# Patient Record
Sex: FEMALE | Race: WHITE | NOT HISPANIC OR LATINO | Employment: OTHER | URBAN - NONMETROPOLITAN AREA
[De-identification: names, ages, dates, MRNs, and addresses within clinical notes are randomized per-mention and may not be internally consistent; named-entity substitution may affect disease eponyms.]

---

## 2019-01-10 ENCOUNTER — OFFICE VISIT (OUTPATIENT)
Dept: ORTHOPEDIC SURGERY | Facility: CLINIC | Age: 69
End: 2019-01-10

## 2019-01-10 DIAGNOSIS — M25.512 BILATERAL SHOULDER PAIN, UNSPECIFIED CHRONICITY: Primary | ICD-10-CM

## 2019-01-10 DIAGNOSIS — M25.511 BILATERAL SHOULDER PAIN, UNSPECIFIED CHRONICITY: Primary | ICD-10-CM

## 2019-01-10 PROCEDURE — 73030 X-RAY EXAM OF SHOULDER: CPT | Performed by: ORTHOPAEDIC SURGERY

## 2019-01-10 PROCEDURE — 99203 OFFICE O/P NEW LOW 30 MIN: CPT | Performed by: ORTHOPAEDIC SURGERY

## 2019-01-10 RX ORDER — PROPRANOLOL HYDROCHLORIDE 40 MG/1
TABLET ORAL
Refills: 3 | COMMUNITY
Start: 2018-11-24

## 2019-01-10 RX ORDER — ASPIRIN 325 MG
325 TABLET ORAL DAILY
COMMUNITY

## 2019-01-10 RX ORDER — LISINOPRIL AND HYDROCHLOROTHIAZIDE 20; 12.5 MG/1; MG/1
TABLET ORAL
Refills: 3 | COMMUNITY
Start: 2018-11-23

## 2019-01-10 RX ORDER — AMLODIPINE BESYLATE 5 MG/1
TABLET ORAL
Refills: 1 | COMMUNITY
Start: 2018-12-04

## 2019-01-10 RX ORDER — DULOXETIN HYDROCHLORIDE 60 MG/1
CAPSULE, DELAYED RELEASE ORAL
Refills: 0 | COMMUNITY
Start: 2018-11-02

## 2019-01-10 NOTE — PROGRESS NOTES
Chief Complaint   Patient presents with   • Left Shoulder - Establish Care   • Right Shoulder - Establish Care   This is a new patient here today complaining of bilateral shoulder pain for several years.        HPI the patient states that she has bilateral shoulder pain.  Left sided symptoms are worse than the right side.  She states that she has had the symptoms for several years but over the past 6 months symptoms have gotten a lot worse.  She describes her sensation of pain is a severe grinding sensation.  It is worse with abduction of the shoulder.  She has difficulty in reaching into the overhead position.  Cross body activities bother the patient significantly.  She has been seeing a pain clinic specialist who has injected her shoulders with steroid.  That intervention does help her shoulder to some degree.  She is here for second opinion because she thinks that her function is getting progressively impaired.  She cannot sleep in bed at night.  She finds it difficult to hold heavy objects into the overhead extended position.  The patient states that she did have a history of juvenile rheumatoid arthritis and that most likely affected her left shoulder as a young girl.  That has left her with a significant amount of arthritis with pain swelling and lack of motion.          Allergies   Allergen Reactions   • Bactrim [Sulfamethoxazole-Trimethoprim] Unknown (See Comments)     UNKNOWN   • Darvon [Propoxyphene] Unknown (See Comments)     UNKNOWN     • Penicillins Unknown (See Comments)     UNKNOWN         Social History     Socioeconomic History   • Marital status:      Spouse name: Not on file   • Number of children: Not on file   • Years of education: Not on file   • Highest education level: Not on file   Social Needs   • Financial resource strain: Not on file   • Food insecurity - worry: Not on file   • Food insecurity - inability: Not on file   • Transportation needs - medical: Not on file   •  Transportation needs - non-medical: Not on file   Occupational History   • Not on file   Tobacco Use   • Smoking status: Not on file   Substance and Sexual Activity   • Alcohol use: Not on file   • Drug use: Not on file   • Sexual activity: Not on file   Other Topics Concern   • Not on file   Social History Narrative   • Not on file       No family history on file.    No past surgical history on file.    No past medical history on file.        There were no vitals filed for this visit.          Review of Systems   Constitutional: Negative.    HENT: Negative.    Eyes: Negative.    Respiratory: Negative.    Cardiovascular: Negative.    Gastrointestinal: Negative.    Endocrine: Negative.    Genitourinary: Negative.    Musculoskeletal: Positive for joint swelling.   Skin: Negative.    Allergic/Immunologic: Negative.    Neurological: Negative.    Hematological: Negative.    Psychiatric/Behavioral: Negative.            Physical Exam   Constitutional: She is oriented to person, place, and time. She appears well-nourished.   HENT:   Head: Atraumatic.   Eyes: EOM are normal.   Neck: Neck supple.   Cardiovascular: Regular rhythm, normal heart sounds and intact distal pulses.   Pulmonary/Chest: Breath sounds normal.   Abdominal: Bowel sounds are normal.   Musculoskeletal: She exhibits edema and tenderness.   Neurological: She is alert and oriented to person, place, and time.   Skin: Skin is dry. Capillary refill takes 2 to 3 seconds.   Psychiatric: She has a normal mood and affect. Her behavior is normal. Judgment and thought content normal.   Nursing note and vitals reviewed.              Joint/Body Part Specific Exam:  bilateral shoulder. Rotator cuff function is extremely impaired. There is a high riding humeral head with articulation of the humerus to the undersurface of the acromiohumeral articulation. AC joint is exquisitely tender and painful for patient. Axillary nerve function is well preserved. There is significant  winging of the scapula with hollowing of the fossae over the proximal and distal aspects of the spine of the scapula. Forward flexion is 0-90 degrees, active abduction is 0-90 degrees. Drop arm sign is positive. External rotation against resistance is extremely painful and limited for the patient. Skin and soft tissues are essentially normal other than some amounts of swelling. The pain level is 5            X-RAY Report:bilateral Shoulder X-Ray  Indication: Evaluation of pain and discomfort in both shoulders  AP and Lateral  Findings: Advanced glenohumeral arthrosis with inferior osteophyte formation and complete loss of joint space  no bony lesion  Soft tissues within normal limits  decreased joint spaces  Hardware appropriately positioned not applicable      no prior studies available for comparison.    X-RAY was ordered and reviewed by Patrice Kelley MD                Diagnostics:            Betsy was seen today for establish care and establish care.    Diagnoses and all orders for this visit:    Bilateral shoulder pain, unspecified chronicity  -     XR Shoulder 2+ View Bilateral            Procedures          I provided this patient with educational materials regarding exercise and bone health.        Plan: Discussed the management of chronic glenohumeral arthrosis with the patient.    At this point I would recommend nonoperative management including an intra-articular steroid injection and anti-inflammatory medication.    Tablet ibuprofen 600 mg orally twice a day for pain swelling and discomfort.    Ice to the shoulder for comfort.    Use overhead pulleys and abduction exercises to prevent arthrofibrosis and a frozen shoulder.    Calcium and vitamin D for bone health.    Follow-up in my office in 6 months for reevaluation.    She will eventually most certainly require total shoulder arthroplasty and we have discussed that the patient at length.            CC To Provider, No Known

## 2019-01-16 PROBLEM — M25.511 BILATERAL SHOULDER PAIN: Status: ACTIVE | Noted: 2019-01-16

## 2019-01-16 PROBLEM — M25.512 BILATERAL SHOULDER PAIN: Status: ACTIVE | Noted: 2019-01-16

## 2019-02-07 NOTE — TELEPHONE ENCOUNTER
PT SEEN January 10TH FOR BILAT SHOULDER PAIN.    SHE CALLED TODAY ASKING FOR SOMETHING FOR PAIN.    PLEASE ADVISE

## 2019-02-08 RX ORDER — TRAMADOL HYDROCHLORIDE 50 MG/1
50 TABLET ORAL EVERY 12 HOURS PRN
Qty: 40 TABLET | Refills: 0 | OUTPATIENT
Start: 2019-02-08

## 2019-02-08 NOTE — TELEPHONE ENCOUNTER
Rx has been called into the patient pharmacy, just waiting for Dr. Kelley to sign off so the RX is recorded into the patients chart.. Patient has been informed via voicemail.